# Patient Record
Sex: MALE | Race: WHITE
[De-identification: names, ages, dates, MRNs, and addresses within clinical notes are randomized per-mention and may not be internally consistent; named-entity substitution may affect disease eponyms.]

---

## 2020-11-11 ENCOUNTER — TRANSCRIPTION ENCOUNTER (OUTPATIENT)
Age: 50
End: 2020-11-11

## 2021-03-07 ENCOUNTER — EMERGENCY (EMERGENCY)
Facility: HOSPITAL | Age: 51
LOS: 1 days | Discharge: HOME | End: 2021-03-07
Attending: STUDENT IN AN ORGANIZED HEALTH CARE EDUCATION/TRAINING PROGRAM
Payer: COMMERCIAL

## 2021-03-07 VITALS
SYSTOLIC BLOOD PRESSURE: 136 MMHG | DIASTOLIC BLOOD PRESSURE: 93 MMHG | RESPIRATION RATE: 18 BRPM | OXYGEN SATURATION: 98 % | HEART RATE: 87 BPM | TEMPERATURE: 98 F

## 2021-03-07 VITALS
TEMPERATURE: 98 F | RESPIRATION RATE: 18 BRPM | WEIGHT: 285.06 LBS | DIASTOLIC BLOOD PRESSURE: 113 MMHG | HEART RATE: 99 BPM | HEIGHT: 69 IN | SYSTOLIC BLOOD PRESSURE: 163 MMHG | OXYGEN SATURATION: 97 %

## 2021-03-07 PROCEDURE — 99283 EMERGENCY DEPT VISIT LOW MDM: CPT | Mod: 25

## 2021-03-07 PROCEDURE — 90471 IMMUNIZATION ADMIN: CPT

## 2021-03-07 PROCEDURE — 99284 EMERGENCY DEPT VISIT MOD MDM: CPT | Mod: 25

## 2021-03-07 PROCEDURE — 99283 EMERGENCY DEPT VISIT LOW MDM: CPT

## 2021-03-07 PROCEDURE — 90715 TDAP VACCINE 7 YRS/> IM: CPT

## 2021-03-07 RX ORDER — POLYMYXIN B SULF/TRIMETHOPRIM 10000-1/ML
1 DROPS OPHTHALMIC (EYE) ONCE
Refills: 0 | Status: DISCONTINUED | OUTPATIENT
Start: 2021-03-07 | End: 2021-03-07

## 2021-03-07 RX ORDER — OFLOXACIN 0.3 %
1 DROPS OPHTHALMIC (EYE) ONCE
Refills: 0 | Status: COMPLETED | OUTPATIENT
Start: 2021-03-07 | End: 2021-03-07

## 2021-03-07 RX ORDER — BACITRACIN ZINC AND POLYMYXIN B SULFATE 500; 10000 [USP'U]/G; [USP'U]/G
1 OINTMENT OPHTHALMIC ONCE
Refills: 0 | Status: COMPLETED | OUTPATIENT
Start: 2021-03-07 | End: 2021-03-07

## 2021-03-07 RX ORDER — TETANUS TOXOID, REDUCED DIPHTHERIA TOXOID AND ACELLULAR PERTUSSIS VACCINE, ADSORBED 5; 2.5; 8; 8; 2.5 [IU]/.5ML; [IU]/.5ML; UG/.5ML; UG/.5ML; UG/.5ML
0.5 SUSPENSION INTRAMUSCULAR ONCE
Refills: 0 | Status: COMPLETED | OUTPATIENT
Start: 2021-03-07 | End: 2021-03-07

## 2021-03-07 RX ADMIN — Medication 1 DROP(S): at 19:17

## 2021-03-07 RX ADMIN — Medication 1 DROP(S): at 19:23

## 2021-03-07 RX ADMIN — BACITRACIN ZINC AND POLYMYXIN B SULFATE 1 APPLICATION(S): 500; 10000 OINTMENT OPHTHALMIC at 19:23

## 2021-03-07 RX ADMIN — TETANUS TOXOID, REDUCED DIPHTHERIA TOXOID AND ACELLULAR PERTUSSIS VACCINE, ADSORBED 0.5 MILLILITER(S): 5; 2.5; 8; 8; 2.5 SUSPENSION INTRAMUSCULAR at 13:51

## 2021-03-07 NOTE — CONSULT NOTE ADULT - ASSESSMENT
Assessment and Recommendations:  50y male with a past medical history/ocular history of N/A consulted for trauma to lower eye lid OD and eye pain, found to have ~1mm marginal Full thickness lid lac as well as Foreign body under upper lid.     # Foreign body under UPper lid OD  - Removed with cotton swab tip on lid eversion  - No apparent Epi defect on my exam  - patient pain, trauma and foreign body - Start Ocuflox 4 times a day and Antibiotic ointment (Allergic to Erythromycin - qHS)     # Lid Lac - ~1 mm marginal   - well opposed.  - images reviewed with Dr. Stringer and Dr. Bustos.   - Decision made together with patient to conservatively watch the laceration given its well clean opposed edges for healing by primary intention    Will monitor as outpatinet    Outpatient Follow-up: Patient should follow-up with his/her ophthalmologist or with [facility] Department of Ophthalmology within 1 week of after discharge at:    [address]  [place] [address]  [phone]    Krishan Asif MD, PGY-2  Pager: [phone]  Also available on [employer] Teams

## 2021-03-07 NOTE — ED ADULT NURSE NOTE - OBJECTIVE STATEMENT
49 y/o M no PMH presents with R eye pain and blurry vision onset suddenly s/p getting hit in eye with metal wire while taking apart a trampoline today. +some bleeding from lower lid. denies ha, nosebleed, mouth pain. NO lOC. bleeding controlled via 4x4 gauze. Pending opthalmology consult. Had tetanus updated now.

## 2021-03-07 NOTE — ED PROVIDER NOTE - NSFOLLOWUPCLINICS_GEN_ALL_ED_FT
[facility] - Ophthalmology  Ophthalmology  [address]  [place] [address]  Phone: [phone]  Fax:   Follow Up Time:

## 2021-03-07 NOTE — ED PROVIDER NOTE - PATIENT PORTAL LINK FT
You can access the FollowMyHealth Patient Portal offered by [facility] by registering at the following website: [email or web address] By joining [facility]’s FollowMyHealth portal, you will also be able to view your health information using other applications (apps) compatible with our system.

## 2021-03-07 NOTE — ED PROVIDER NOTE - CARE PLAN
Principal Discharge DX:	Corneal abrasion   Principal Discharge DX:	Corneal abrasion  Secondary Diagnosis:	Eyelid laceration, right

## 2021-03-07 NOTE — ED PROVIDER NOTE - PHYSICAL EXAMINATION
Gen: well developed male NAD   HEENT: EOMI with mild pain to R eye. +small ~0.25cm laceration to inferior lower eyelid with small amount bleeding. conjunctival erythema to R eye. no bony tenderness. pupil light reflex intact and equal b/l. no nasal discharge, mucous membranes moist. no юлия sign.   On woodslamp exam with fluorescein stain +uptake to R / inferior cornea.   ocular pressures: 22 R eye, 14 L eye.   Visual acuity: 20/40 b/l, 20/50 L eye, 20/100 R eye  CV: RRR, +S1/S2, no M/R/G  Resp: CTAB, no W/R/R  GI: Abdomen soft non-distended, NTTP, no masses  MSK: No open wounds, no bruising, no LE edema  Neuro: A&Ox4, following commands, moving all four extremities spontaneously  Psych: appropriate mood

## 2021-03-07 NOTE — CONSULT NOTE ADULT - SUBJECTIVE AND OBJECTIVE BOX
[facility] DEPARTMENT OF OPHTHALMOLOGY - INITIAL ADULT CONSULT  -----------------------------------------------------------------------------------------------------------------  Krishan Asif MD PGY 2  Pager: [phone]  -----------------------------------------------------------------------------------------------------------------    HPI:  As per Ed  49 y/o M no PMH presents with R eye pain and blurry vision onset suddenly s/p getting hit in eye with metal wire while taking apart a trampoline today. +some bleeding from lower lid. denies ha, nosebleed, mouth pain.    Interval History: Ophthalmology consulted for Lid lac and eye pain. History as above. the patient reports was taking apart trampoline and metal wire swung past his face , knicking the skin under his eye. Patient reports FBS.    Pt. Denies Redness, flashes or floater, and double vision.     PAST MEDICAL & SURGICAL HISTORY:  No pertinent past medical history    No significant past surgical history      Past Ocular History: N/A    Family History:  FAMILY HISTORY:      Social History: N/A    Ophthalmic Medications: N/A    MEDICATIONS  (STANDING):    MEDICATIONS  (PRN):    Allergies & Intolerances:   erythromycin (Unknown)    Review of Systems:  Constitutional: No fever, chills  Eyes: As above  Neuro: No tremors  Cardiovascular: No chest pain, palpitations  Respiratory: No SOB, no cough  GI: No nausea, vomiting, abdominal pain  : No dysuria  Skin: no rash  Psych: no depression  Endocrine: no polyuria, polydipsia  Heme/lymph: no swelling    VITALS: T(C): 36.7 (03-07-21 @ 19:43)  T(F): 98.1 (03-07-21 @ 19:43), Max: 98.4 (03-07-21 @ 17:39)  HR: 87 (03-07-21 @ 19:43) (80 - 99)  BP: 136/93 (03-07-21 @ 19:43) (112/68 - 163/113)  RR:  (18 - 18)  SpO2:  (97% - 98%)  Wt(kg): --  General: AAO x 3, appropriate mood and affect    Ophthalmology Exam:  Visual acuity (sc): 20/20 OU.  Pupils: PERRL OU, no APD  Tonometry:  14 OU  Extraocular movements (EOMs): Full OU, no pain, no diplopia.  Confrontational Visual Field (CVF): Full OU.  Color Plates: 12/12 OU.    Pen Light Exam (PLE)  External: Normal OU.  Lids/Lashes/Lacrimal Ducts: OD: mild Swelling and ecchymosis lower lid. Small ~1mm Full thickness Lid laceration at the lid margin. Clean edges, well apposed, no bleeding. Upper eyelid eversion revealed small black Foreign body, either small piece of metal or Dirt. OS within normal limits   Sclera/Conjunctiva: White and quiet OU.  Cornea: Clear OU. No Epidefect appreciated  Anterior Chamber: Deep and formed OU.    Iris: Flat OU.  Lens: Clear OU.    Fundus Exam: dilated with 1% tropicamide and 2.5% phenylephrine  Approval obtained from primary team for dilation  Patient aware that pupils can remained dilated for at least 4-6 hours.  Exam performed with 20 D lens    Vitreous: wnl OU  Disc, cup/disc: sharp and pink, 0.4 OU  Macula: wnl OU  Vessels: wnl OU  Periphery: wnl OU    Labs/Imaging:

## 2021-03-07 NOTE — ED PROVIDER NOTE - NSFOLLOWUPINSTRUCTIONS_ED_ALL_ED_FT
Corneal Abrasion    The cornea is the clear covering at the front and center of the eye. This very thin tissue is made up of many layers. If a scratch or injury causes the corneal epithelium to come off, it is called a corneal abrasion. Symptoms include eye pain, redness, tearing, difficulty keeping eye open, and light sensitivity. Do not drive or operate machinery if your eye is patched.  Antibiotic eye drops may be prescribed to reduce the risk of infection.  It is important to follow up with an ophthalmologist (eye doctor) to ensure proper healing.    SEEK IMMEDIATE MEDICAL CARE IF YOU HAVE ANY OF THE FOLLOWING SYMPTOMS: discharge from eyes, changes in vision, fever, or swelling. Corneal Abrasion    The cornea is the clear covering at the front and center of the eye. This very thin tissue is made up of many layers. If a scratch or injury causes the corneal epithelium to come off, it is called a corneal abrasion. Symptoms include eye pain, redness, tearing, difficulty keeping eye open, and light sensitivity. Do not drive or operate machinery if your eye is patched.     Use ofloxacin drops 1-2 drops in right eye 4-6 x per day for the next 7 days.   Use polytrim ointment apply to right eye 2-3x per day for the next 7 days.    Keep eye clean and dry - return to ED for any further bleeding, worsening visual changes, drainage from the eye, worsening pain or any new concerning symptoms.     It is important to follow up with an ophthalmologist (eye doctor) to ensure proper healing - call tomorrow to schedule follow-up with eye doctor.     SEEK IMMEDIATE MEDICAL CARE IF YOU HAVE ANY OF THE FOLLOWING SYMPTOMS: discharge from eyes, changes in vision, fever, or swelling.

## 2021-03-07 NOTE — ED PROVIDER NOTE - CLINICAL SUMMARY MEDICAL DECISION MAKING FREE TEXT BOX
young male sudden onset R eye pain / redness s/p hit with metal wire. also with lac to inferior lid line crossing margin. normal eye pressures b/l. dec visual acuity R eye. concern for corneal abrasion, lid lac, low suspicion globe rupture, retroorbital hematoma. plan ophtho consult young male sudden onset R eye pain / redness s/p hit with metal wire. also with lac to inferior lid line crossing margin. normal eye pressures b/l. dec visual acuity R eye. concern for corneal abrasion, lid lac, low suspicion globe rupture, retroorbital hematoma. plan ophtho consult  Luz: Patient with right eye pain/ redness s/p hitting with metal wire. laceration to inferior lid line crossing margin. normal eye pressure, + corneal abrasion on right eye. no sign of globe rupture.  will speak with ophto for possible lid laceration repair.

## 2021-03-07 NOTE — ED PROVIDER NOTE - OBJECTIVE STATEMENT
51 y/o M no PMH presents with R eye pain and blurry vision onset suddenly s/p getting hit in eye with metal wire while taking apart a trampoline today. +some bleeding from lower lid. denies ha, nosebleed, mouth pain.

## 2022-08-30 ENCOUNTER — RX RENEWAL (OUTPATIENT)
Age: 52
End: 2022-08-30

## 2022-08-30 RX ORDER — DICLOFENAC SODIUM 75 MG/1
75 TABLET, DELAYED RELEASE ORAL
Qty: 60 | Refills: 3 | Status: ACTIVE | COMMUNITY
Start: 2022-08-30 | End: 1900-01-01

## 2023-04-17 DIAGNOSIS — M54.16 RADICULOPATHY, LUMBAR REGION: ICD-10-CM

## 2023-04-17 RX ORDER — METHOCARBAMOL 750 MG/1
750 TABLET, FILM COATED ORAL
Qty: 60 | Refills: 0 | Status: ACTIVE | COMMUNITY
Start: 2023-04-17 | End: 1900-01-01

## 2023-04-17 RX ORDER — DICLOFENAC SODIUM 75 MG/1
75 TABLET, DELAYED RELEASE ORAL
Qty: 60 | Refills: 0 | Status: ACTIVE | COMMUNITY
Start: 2023-04-17 | End: 1900-01-01

## 2023-04-17 RX ORDER — METHYLPREDNISOLONE 4 MG/1
4 TABLET ORAL
Qty: 1 | Refills: 0 | Status: ACTIVE | COMMUNITY
Start: 2023-04-17 | End: 1900-01-01

## 2025-09-11 ENCOUNTER — APPOINTMENT (OUTPATIENT)
Dept: ORTHOPEDIC SURGERY | Facility: CLINIC | Age: 55
End: 2025-09-11
Payer: COMMERCIAL

## 2025-09-11 VITALS — WEIGHT: 185 LBS | HEIGHT: 70 IN | BODY MASS INDEX: 26.48 KG/M2

## 2025-09-11 DIAGNOSIS — Z78.9 OTHER SPECIFIED HEALTH STATUS: ICD-10-CM

## 2025-09-11 DIAGNOSIS — S93.401A SPRAIN OF UNSPECIFIED LIGAMENT OF RIGHT ANKLE, INITIAL ENCOUNTER: ICD-10-CM

## 2025-09-11 PROCEDURE — ZZZZZ: CPT
